# Patient Record
Sex: MALE | Race: WHITE | NOT HISPANIC OR LATINO | Employment: UNEMPLOYED | URBAN - METROPOLITAN AREA
[De-identification: names, ages, dates, MRNs, and addresses within clinical notes are randomized per-mention and may not be internally consistent; named-entity substitution may affect disease eponyms.]

---

## 2021-11-03 PROBLEM — F17.200 TOBACCO DEPENDENCE SYNDROME: Status: ACTIVE | Noted: 2019-08-28

## 2021-11-03 PROBLEM — E55.9 VITAMIN D DEFICIENCY: Status: ACTIVE | Noted: 2020-11-04

## 2021-11-03 PROBLEM — F32.A DEPRESSION: Status: ACTIVE | Noted: 2019-08-26

## 2021-11-03 PROBLEM — E83.42 HYPOMAGNESEMIA: Status: ACTIVE | Noted: 2019-11-20

## 2021-11-03 PROBLEM — I95.1 ORTHOSTATIC HYPOTENSION: Status: ACTIVE | Noted: 2019-11-07

## 2021-11-03 PROBLEM — J30.2 SEASONAL ALLERGIES: Status: ACTIVE | Noted: 2019-08-26

## 2021-11-03 PROBLEM — F41.1 GENERALIZED ANXIETY DISORDER: Status: ACTIVE | Noted: 2021-11-03

## 2021-11-03 PROBLEM — F10.21 HISTORY OF ALCOHOLISM: Status: ACTIVE | Noted: 2019-08-26

## 2021-11-15 ENCOUNTER — CLINICAL SUPPORT (OUTPATIENT)
Dept: SMOKING CESSATION | Facility: CLINIC | Age: 36
End: 2021-11-15
Payer: MEDICAID

## 2021-11-15 DIAGNOSIS — F17.200 NICOTINE DEPENDENCE: Primary | ICD-10-CM

## 2021-11-15 PROCEDURE — 99404 PREV MED CNSL INDIV APPRX 60: CPT | Mod: S$GLB,,, | Performed by: GENERAL PRACTICE

## 2021-11-15 PROCEDURE — 99404 PR PREVENT COUNSEL,INDIV,60 MIN: ICD-10-PCS | Mod: S$GLB,,, | Performed by: GENERAL PRACTICE

## 2021-11-15 RX ORDER — IBUPROFEN 200 MG
1 TABLET ORAL DAILY
Qty: 14 PATCH | Refills: 0 | Status: SHIPPED | OUTPATIENT
Start: 2021-11-15 | End: 2021-11-29 | Stop reason: SDUPTHER

## 2021-11-29 ENCOUNTER — CLINICAL SUPPORT (OUTPATIENT)
Dept: SMOKING CESSATION | Facility: CLINIC | Age: 36
End: 2021-11-29
Payer: MEDICAID

## 2021-11-29 DIAGNOSIS — F17.200 NICOTINE DEPENDENCE: Primary | ICD-10-CM

## 2021-11-29 PROCEDURE — 99402 PR PREVENT COUNSEL,INDIV,30 MIN: ICD-10-PCS | Mod: S$GLB,,, | Performed by: GENERAL PRACTICE

## 2021-11-29 PROCEDURE — 99402 PREV MED CNSL INDIV APPRX 30: CPT | Mod: S$GLB,,, | Performed by: GENERAL PRACTICE

## 2021-11-29 RX ORDER — IBUPROFEN 200 MG
1 TABLET ORAL DAILY
Qty: 14 PATCH | Refills: 0 | Status: SHIPPED | OUTPATIENT
Start: 2021-11-29 | End: 2021-12-21 | Stop reason: SDUPTHER

## 2021-12-14 ENCOUNTER — CLINICAL SUPPORT (OUTPATIENT)
Dept: SMOKING CESSATION | Facility: CLINIC | Age: 36
End: 2021-12-14
Payer: MEDICAID

## 2021-12-14 DIAGNOSIS — F17.200 NICOTINE DEPENDENCE: Primary | ICD-10-CM

## 2021-12-14 PROCEDURE — 99403 PR PREVENT COUNSEL,INDIV,45 MIN: ICD-10-PCS | Mod: S$GLB,,, | Performed by: GENERAL PRACTICE

## 2021-12-14 PROCEDURE — 99403 PREV MED CNSL INDIV APPRX 45: CPT | Mod: S$GLB,,, | Performed by: GENERAL PRACTICE

## 2021-12-14 RX ORDER — VARENICLINE TARTRATE 1 MG/1
1 TABLET, FILM COATED ORAL 2 TIMES DAILY
Qty: 60 TABLET | Refills: 0 | Status: SHIPPED | OUTPATIENT
Start: 2021-12-14 | End: 2022-03-01

## 2021-12-21 ENCOUNTER — CLINICAL SUPPORT (OUTPATIENT)
Dept: SMOKING CESSATION | Facility: CLINIC | Age: 36
End: 2021-12-21
Payer: MEDICAID

## 2021-12-21 DIAGNOSIS — F17.200 NICOTINE DEPENDENCE: Primary | ICD-10-CM

## 2021-12-21 PROCEDURE — 99402 PR PREVENT COUNSEL,INDIV,30 MIN: ICD-10-PCS | Mod: S$GLB,,, | Performed by: GENERAL PRACTICE

## 2021-12-21 PROCEDURE — 99402 PREV MED CNSL INDIV APPRX 30: CPT | Mod: S$GLB,,, | Performed by: GENERAL PRACTICE

## 2021-12-21 RX ORDER — IBUPROFEN 200 MG
1 TABLET ORAL DAILY
Qty: 14 PATCH | Refills: 0 | Status: SHIPPED | OUTPATIENT
Start: 2021-12-21 | End: 2022-01-04 | Stop reason: SDUPTHER

## 2022-01-04 ENCOUNTER — CLINICAL SUPPORT (OUTPATIENT)
Dept: SMOKING CESSATION | Facility: CLINIC | Age: 37
End: 2022-01-04
Payer: MEDICAID

## 2022-01-04 DIAGNOSIS — F17.200 NICOTINE DEPENDENCE: ICD-10-CM

## 2022-01-04 PROCEDURE — 99402 PR PREVENT COUNSEL,INDIV,30 MIN: ICD-10-PCS | Mod: S$GLB,,, | Performed by: GENERAL PRACTICE

## 2022-01-04 PROCEDURE — 99402 PREV MED CNSL INDIV APPRX 30: CPT | Mod: S$GLB,,, | Performed by: GENERAL PRACTICE

## 2022-01-04 RX ORDER — IBUPROFEN 200 MG
1 TABLET ORAL DAILY
Qty: 14 PATCH | Refills: 0 | Status: SHIPPED | OUTPATIENT
Start: 2022-01-04 | End: 2022-01-31 | Stop reason: SDUPTHER

## 2022-01-04 NOTE — Clinical Note
Spoke with patient at length via phone regarding tobacco cessation progress. Patient remains on prescribed tobacco cessation medication regimen of 14 mg patch without any negative side effects at this time. Reordered patches. Patient stated that he has not called to check on the Chantix prescription, he will do that today. Patient stated that he goes a day or two and does not smoke and then give in and smoke one a day due to habit or just because he wants to. I encouraged him to focus on the reason why he wanted to quit in the beginning. He was going through depression and addicted to alcohol. Now he does not have those issues that triggered him to smoke so he knows that he does not have to smoke.  Discussed strategies, cues, and triggers. Encouraged him to delay and challenge himself to not give in. The patient will continue with therapy sessions and medication monitoring by CTTS. Prescribed medication management will be by physician. The patient denies any abnormal behavioral or mental changes at this time.

## 2022-01-04 NOTE — PROGRESS NOTES
Individual Follow-Up Form    1/4/2022    Quit Date: none    Clinical Status of Patient: Outpatient    Length of Service: 30 minutes    Continuing Medication: yes  Patches    Other Medications: Chantix not recieved     Target Symptoms: Withdrawal and medication side effects. The following were  rated moderate (3) to severe (4) on TCRS:  · Moderate (3): none  · Severe (4): none    Comments: Spoke with patient at length via phone regarding tobacco cessation progress. Patient remains on prescribed tobacco cessation medication regimen of 14 mg patch without any negative side effects at this time. Reordered patches. Patient stated that he has not called to check on the Chantix prescription, he will do that today. Patient stated that he goes a day or two and does not smoke and then give in and smoke one a day due to habit or just because he wants to. I encouraged him to focus on the reason why he wanted to quit in the beginning. He was going through depression and addicted to alcohol. Now he does not have those issues that triggered him to smoke so he knows that he does not have to smoke.  Discussed strategies, cues, and triggers. Encouraged him to delay and challenge himself to not give in. We also discussed ways to relax and keep busy- yoga. The patient will continue with therapy sessions and medication monitoring by CTTS. Prescribed medication management will be by physician. The patient denies any abnormal behavioral or mental changes at this time.     Diagnosis: F17.200    Next Visit: 2 weeks

## 2022-01-18 ENCOUNTER — CLINICAL SUPPORT (OUTPATIENT)
Dept: SMOKING CESSATION | Facility: CLINIC | Age: 37
End: 2022-01-18
Payer: MEDICAID

## 2022-01-18 DIAGNOSIS — F17.200 NICOTINE DEPENDENCE: Primary | ICD-10-CM

## 2022-01-18 PROCEDURE — 99402 PREV MED CNSL INDIV APPRX 30: CPT | Mod: S$GLB,,, | Performed by: GENERAL PRACTICE

## 2022-01-18 PROCEDURE — 99402 PR PREVENT COUNSEL,INDIV,30 MIN: ICD-10-PCS | Mod: S$GLB,,, | Performed by: GENERAL PRACTICE

## 2022-01-18 NOTE — Clinical Note
Spoke with patient at length via phone regarding tobacco cessation progress. Patient remains on prescribed tobacco cessation medication regimen of 14 mg patch without any negative side effects at this time. No refill. Patient informed me that Medicaid denied paying Chantix. He not sure why but plans to call them this week. Patient continues to go a day or two and does not smoke and then give in and smoke one a day due to habit or just because he wants to. He have urges when he does not wear the patch. I advised to wear the patches daily. Patient stated this past week and the current week is a little stressful because of comprehensive exams but he sis not smoke yesterday and plans not to smoke today. Stopping at the convenient store for coffee is a trigger. Reviewed strategies, cues, and triggers. The patient will continue with  therapy sessions and medication monitoring by CTTS. Prescribed medication management will be by physician. The patient denies any abnormal behavioral or mental changes at this time.

## 2022-01-18 NOTE — PROGRESS NOTES
Individual Follow-Up Form    1/18/2022    Quit Date: none    Clinical Status of Patient: Outpatient    Length of Service: 30 minutes    Continuing Medication: yes  Patches    Other Medications: none     Target Symptoms: Withdrawal and medication side effects. The following were rated moderate (3) to severe (4) on TCRS:  · Moderate (3): none  · Severe (4): none    Comments: Spoke with patient at length via phone regarding tobacco cessation progress. Patient remains on prescribed tobacco cessation medication regimen of 14 mg patch without any negative side effects at this time. No refill needed at this time. Patient informed me that Medicaid denied paying for Chantix. He is not sure why but plans to call them this week. Patient continues to go a day or two and does not smoke and then give in and smoke one a day due to habit or just because he wants to. He have urges when he does not wear the patch. I advised to wear the patches daily. Patient stated this past week and the current week is a little stressful because of comprehensive exams but he sis not smoke yesterday and plans not to smoke today. Stopping at the convenient store for coffee is a trigger. Reviewed strategies, cues, and triggers. I advised patient to be more disciplined and find ways to keep busy and not give in just because he wants to. The patient will continue with  therapy sessions and medication monitoring by CTTS. Prescribed medication management will be by physician. The patient denies any abnormal behavioral or mental changes at this time.           Diagnosis: F17.200    Next Visit: 2 weeks

## 2022-01-31 ENCOUNTER — CLINICAL SUPPORT (OUTPATIENT)
Dept: SMOKING CESSATION | Facility: CLINIC | Age: 37
End: 2022-01-31
Payer: MEDICAID

## 2022-01-31 DIAGNOSIS — F17.200 NICOTINE DEPENDENCE: ICD-10-CM

## 2022-01-31 PROCEDURE — 99402 PR PREVENT COUNSEL,INDIV,30 MIN: ICD-10-PCS | Mod: S$GLB,,, | Performed by: GENERAL PRACTICE

## 2022-01-31 PROCEDURE — 99402 PREV MED CNSL INDIV APPRX 30: CPT | Mod: S$GLB,,, | Performed by: GENERAL PRACTICE

## 2022-01-31 RX ORDER — IBUPROFEN 200 MG
1 TABLET ORAL DAILY
Qty: 14 PATCH | Refills: 0 | Status: SHIPPED | OUTPATIENT
Start: 2022-01-31 | End: 2022-03-08

## 2022-01-31 RX ORDER — MICONAZOLE NITRATE 2 %
2 CREAM (GRAM) TOPICAL
Qty: 144 EACH | Refills: 0 | Status: SHIPPED | OUTPATIENT
Start: 2022-01-31 | End: 2022-03-08

## 2022-01-31 NOTE — Clinical Note
Spoke with patient at length via phone regarding tobacco cessation progress. Patient remains on prescribed tobacco cessation medication regimen of 14 mg patch without any negative side effects at this time. Reordered patches or ordered 2mg nicotine gum. Patient stated that he has not called to check with Medicaid to see why they denied Chantix. Patient stated that he went 6 days without smoking the week of January 17th. He then smoked a cigar as a reward for completing a 12 page paper. Patient also stated that his cigarette lighter is a trigger he plans to move it and replace candles with wax burners. Discussed strategies, cues, and triggers. Encouraged him to delay and challenge himself to not give in and wear the patches daily and use nicotine gum as needed. The patient will continue with therapy sessions and medication monitoring by CTTS. Prescribed medication management will be by physician. The patient denies any abnormal behavioral or mental changes at this time.

## 2022-02-14 ENCOUNTER — TELEPHONE (OUTPATIENT)
Dept: SMOKING CESSATION | Facility: CLINIC | Age: 37
End: 2022-02-14
Payer: MEDICAID

## 2022-03-01 ENCOUNTER — TELEPHONE (OUTPATIENT)
Dept: SMOKING CESSATION | Facility: CLINIC | Age: 37
End: 2022-03-01
Payer: MEDICAID

## 2022-03-01 ENCOUNTER — CLINICAL SUPPORT (OUTPATIENT)
Dept: SMOKING CESSATION | Facility: CLINIC | Age: 37
End: 2022-03-01
Payer: MEDICAID

## 2022-03-01 DIAGNOSIS — F17.200 NICOTINE DEPENDENCE: Primary | ICD-10-CM

## 2022-03-01 PROCEDURE — 99402 PR PREVENT COUNSEL,INDIV,30 MIN: ICD-10-PCS | Mod: S$GLB,,, | Performed by: GENERAL PRACTICE

## 2022-03-01 PROCEDURE — 99402 PREV MED CNSL INDIV APPRX 30: CPT | Mod: S$GLB,,, | Performed by: GENERAL PRACTICE

## 2022-03-01 RX ORDER — VARENICLINE TARTRATE 1 MG/1
1 TABLET, FILM COATED ORAL 2 TIMES DAILY
Qty: 56 TABLET | Refills: 0 | Status: SHIPPED | OUTPATIENT
Start: 2022-03-01 | End: 2023-01-24

## 2022-03-01 NOTE — Clinical Note
Spoke with patient at length via phone regarding tobacco cessation progress. Patient remains on prescribed tobacco cessation medication regimen of 14 mg patch without any negative side effects at this time. Patient's insurance dis not pay for the nicotine gum nor the Chantix. I will submit Chantix for mail order. Patient stated that he has not been focused on quitting smoking because he had a sinus infection and dealing with  test. Discussed strategies, cues, and triggers. Encouraged him to delay and challenge himself to not give in and wear the patches daily. The patient will continue with therapy sessions and medication monitoring by CTTS. Prescribed medication management will be by physician. The patient denies any abnormal behavioral or mental changes at this time.

## 2022-03-01 NOTE — PROGRESS NOTES
Individual Follow-Up Form    3/1/2022    Quit Date: none    Clinical Status of Patient: Outpatient    Length of Service: 30 minutes    Continuing Medication: yes  Patches    Other Medications: 2mg gum no     Target Symptoms: Withdrawal and medication side effects. The following were  rated moderate (3) to severe (4) on TCRS:  · Moderate (3): none  · Severe (4): none    Comments: Spoke with patient at length via phone regarding tobacco cessation progress. Patient remains on prescribed tobacco cessation medication regimen of 14 mg patch without any negative side effects at this time. Patient's insurance dis not pay for the nicotine gum nor the Chantix. I will submit Chantix for mail order. Patient stated that he has not been focused on quitting smoking because he had a sinus infection and dealing with  test. Discussed strategies, cues, and triggers. Encouraged him to delay and challenge himself to not give in and wear the patches daily. The patient will continue with therapy sessions and medication monitoring by CTTS. Prescribed medication management will be by physician. The patient denies any abnormal behavioral or mental changes at this time.     Diagnosis: F17.200    Next Visit: 2 weeks

## 2022-03-08 ENCOUNTER — TELEPHONE (OUTPATIENT)
Dept: SMOKING CESSATION | Facility: CLINIC | Age: 37
End: 2022-03-08
Payer: MEDICAID

## 2022-03-08 PROBLEM — M19.90 ARTHRITIS: Status: ACTIVE | Noted: 2022-03-08

## 2022-03-10 ENCOUNTER — TELEPHONE (OUTPATIENT)
Dept: PHARMACY | Facility: CLINIC | Age: 37
End: 2022-03-10
Payer: MEDICAID

## 2022-03-14 ENCOUNTER — TELEPHONE (OUTPATIENT)
Dept: SMOKING CESSATION | Facility: CLINIC | Age: 37
End: 2022-03-14
Payer: MEDICAID

## 2022-03-21 ENCOUNTER — CLINICAL SUPPORT (OUTPATIENT)
Dept: SMOKING CESSATION | Facility: CLINIC | Age: 37
End: 2022-03-21
Payer: MEDICAID

## 2022-03-21 DIAGNOSIS — F17.200 NICOTINE DEPENDENCE: Primary | ICD-10-CM

## 2022-03-21 PROCEDURE — 99402 PREV MED CNSL INDIV APPRX 30: CPT | Mod: S$GLB,,, | Performed by: GENERAL PRACTICE

## 2022-03-21 PROCEDURE — 99402 PR PREVENT COUNSEL,INDIV,30 MIN: ICD-10-PCS | Mod: S$GLB,,, | Performed by: GENERAL PRACTICE

## 2022-03-21 NOTE — PROGRESS NOTES
Individual Follow-Up Form    3/21/2022    Quit Date: none    Clinical Status of Patient: Outpatient    Length of Service: 30 minutes    Continuing Medication: yes  Wellbutrin    Other Medications: Chantix and 14 mg Patches NO     Target Symptoms: Withdrawal and medication side effects. The following were  rated moderate (3) to severe (4) on TCRS:  · Moderate (3): none  · Severe (4): none    Comments: Spoke with patient at length via phone regarding tobacco cessation progress. Patient has discontinued 14mg patches. Patient remains on prescribed tobacco cessation medication regimen of Wellbutrin SR 150mg SR (PCP) day 12 without any negative side effects at this time. Chantix was ordered and mailed to patient prior to PCP ordering Wellbutrin. Patient is not taking Chantix at this time. Patient stated that he is smoking 1 to 2 small cigars per day. Patient set a quit date for Wednesday 3/23/22. Discussed strategies, cues, and triggers. 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 I encouraged patient to call me if he has strong craving and want to give in. The patient will continue with therapy sessions and medication monitoring by CTTS. Prescribed medication management will be by physician. The patient denies any abnormal behavioral or mental changes at this time.     Diagnosis: F17.200    Next Visit: 2 weeks

## 2022-03-21 NOTE — Clinical Note
Spoke with patient at length via phone regarding tobacco cessation progress. Patient has discontinued 14mg patches. Patient remains on prescribed tobacco cessation medication regimen of Wellbutrin SR 150mg SR (PCP) day 12 without any negative side effects at this time. Chantix was ordered and mailed to patient prior to PCP ordering Wellbutrin. Patient is not taking Chantix at this time. Patient stated that he is smoking 1 to 2 small cigars per day. Patient set a quit date for Wednesday 3/23/22. Discussed strategies, cues, and triggers. 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 I encouraged patient to call me if he has strong craving and want to give in. The patient will continue with therapy sessions and medication monitoring by CTTS. Prescribed medication management will be by physician. The patient denies any abnormal behavioral or mental changes at this time.

## 2022-03-29 NOTE — Clinical Note
Spoke with patient at length via phone regarding tobacco cessation progress. Patient remains on prescribed tobacco cessation medication regimen of 14 mg patch without any negative side effects at this time. Patient's insurance dis not pay for the nicotine gum nor the Chantix. I will submit Chantix for mail order. Patient stated that he has not been focused on quitting smoking because he had a sinus infection and dealing with  test. Discussed strategies, cues, and triggers. Encouraged him to delay and challenge himself to not give in and wear the patches daily. The patient will continue with therapy sessions and medication monitoring by CTTS. Prescribed medication management will be by physician. The patient denies any abnormal behavioral or mental changes at this time.  Patient has no objection to blood transfusions.

## 2022-04-04 ENCOUNTER — TELEPHONE (OUTPATIENT)
Dept: SMOKING CESSATION | Facility: CLINIC | Age: 37
End: 2022-04-04
Payer: MEDICAID

## 2022-05-03 ENCOUNTER — TELEPHONE (OUTPATIENT)
Dept: SMOKING CESSATION | Facility: CLINIC | Age: 37
End: 2022-05-03
Payer: MEDICAID

## 2022-05-03 NOTE — TELEPHONE ENCOUNTER
Patient called and stated that he was on Wellbutrin from PCP, Katya Villasenor, and it was not help with depression nor quittng smoking. He toni his mood swings got worse. He is tapering off of it. I advised him to send him a message so that she was aware of the changes. Patient informed me that he would like to start taking the Chantix medication that was prescribes 2 months ago.   
[phone]

## 2022-11-16 ENCOUNTER — CLINICAL SUPPORT (OUTPATIENT)
Dept: SMOKING CESSATION | Facility: CLINIC | Age: 37
End: 2022-11-16

## 2022-11-16 DIAGNOSIS — F17.200 NICOTINE DEPENDENCE: Primary | ICD-10-CM

## 2022-11-16 PROCEDURE — 99407 PR TOBACCO USE CESSATION INTENSIVE >10 MINUTES: ICD-10-PCS | Mod: S$GLB,,,

## 2022-11-16 PROCEDURE — 99407 BEHAV CHNG SMOKING > 10 MIN: CPT | Mod: S$GLB,,,

## 2022-11-16 NOTE — PROGRESS NOTES
Spoke with patient today in regard to smoking cessation progress for 12 month telephone follow up, he states he has been tobacco free for 3 weeks.  Patient states not interested in returning to the program at this time.  Patient states he started Chantix a month ago through his PCP and is doing well.  Informed patient of benefit period, future follow up, and contact information if any further help or support is needed. Will complete smart form for 3/6/12 month follow up and resolve Quit attempt #1.

## 2023-02-28 PROBLEM — Z87.891 HISTORY OF SMOKING: Status: ACTIVE | Noted: 2019-08-28

## 2023-04-19 PROBLEM — I60.9 SUBARACHNOID HEMORRHAGE: Status: ACTIVE | Noted: 2023-03-29
